# Patient Record
Sex: FEMALE | Race: OTHER | HISPANIC OR LATINO | Employment: FULL TIME | URBAN - METROPOLITAN AREA
[De-identification: names, ages, dates, MRNs, and addresses within clinical notes are randomized per-mention and may not be internally consistent; named-entity substitution may affect disease eponyms.]

---

## 2020-09-25 ENCOUNTER — OFFICE VISIT (OUTPATIENT)
Dept: FAMILY MEDICINE CLINIC | Facility: CLINIC | Age: 20
End: 2020-09-25
Payer: COMMERCIAL

## 2020-09-25 VITALS
SYSTOLIC BLOOD PRESSURE: 120 MMHG | HEIGHT: 63 IN | OXYGEN SATURATION: 98 % | TEMPERATURE: 99.1 F | HEART RATE: 80 BPM | WEIGHT: 121 LBS | BODY MASS INDEX: 21.44 KG/M2 | DIASTOLIC BLOOD PRESSURE: 70 MMHG

## 2020-09-25 DIAGNOSIS — R45.89 SYMPTOMS OF DEPRESSION: ICD-10-CM

## 2020-09-25 DIAGNOSIS — Z23 IMMUNIZATION DUE: Primary | ICD-10-CM

## 2020-09-25 DIAGNOSIS — Z00.00 ANNUAL PHYSICAL EXAM: ICD-10-CM

## 2020-09-25 PROCEDURE — 3725F SCREEN DEPRESSION PERFORMED: CPT

## 2020-09-25 PROCEDURE — 90471 IMMUNIZATION ADMIN: CPT

## 2020-09-25 PROCEDURE — 90472 IMMUNIZATION ADMIN EACH ADD: CPT

## 2020-09-25 PROCEDURE — 90686 IIV4 VACC NO PRSV 0.5 ML IM: CPT

## 2020-09-25 PROCEDURE — 99395 PREV VISIT EST AGE 18-39: CPT

## 2020-09-25 PROCEDURE — 90715 TDAP VACCINE 7 YRS/> IM: CPT

## 2020-09-25 NOTE — PROGRESS NOTES
Assessment/Plan:         Problem List Items Addressed This Visit        Other    Annual physical exam     Pt wants labs         Relevant Orders    CBC and differential    Comprehensive metabolic panel    Lipid panel    Symptoms of depression     Pt feels she gets this on off most of her life has never pursued this since always seems to get through it  Recommend pt see therapist she will consider not suicidal           Other Visit Diagnoses     Immunization due    -  Primary    Relevant Orders    TDAP VACCINE GREATER THAN OR EQUAL TO 8YO IM    influenza vaccine, quadrivalent, 0 5 mL, preservative-free, for adult and pediatric patients 6 mos+ (AFLURIA, FLUARIX, FLULAVAL, FLUZONE)            Subjective:  Pt here for  physicalDepression Screening Follow-up Plan: Patient's depression screening was positive with a PHQ-2 score of 2  Their PHQ-9 score was   Patient assessed for underlying major depression  They have no active suicidal ideations  Brief counseling provided and recommend additional follow-up/re-evaluation next office visit  Patient ID: Koki Hopkins is a 23 y o  female  HPI    The following portions of the patient's history were reviewed and updated as appropriate:   She has no past medical history on file  ,  does not have any pertinent problems on file  ,   has a past surgical history that includes Breast lumpectomy (Right, 2015); Cystectomy (2000); and Hernia repair (2002)  ,  family history includes Aneurysm in her maternal grandmother; Diabetes in her paternal grandfather; Hypertension in her maternal grandmother  ,   reports that she has been smoking cigarettes  She has never used smokeless tobacco  She reports current alcohol use  She reports that she does not use drugs  ,  has No Known Allergies     Current Outpatient Medications   Medication Sig Dispense Refill    norethindrone-ethinyl estradiol-iron (MICROGESTIN FE1 5/30) 1 5-30 MG-MCG tablet Take 1 tablet by mouth daily       No current facility-administered medications for this visit  Review of Systems   Constitutional: Negative for appetite change, chills, fatigue and fever  Respiratory: Negative for cough, chest tightness and shortness of breath  Cardiovascular: Negative for chest pain, palpitations and leg swelling  Gastrointestinal: Negative for abdominal pain, constipation, diarrhea, nausea and vomiting  Genitourinary: Negative for difficulty urinating and frequency  Musculoskeletal: Negative for arthralgias, back pain and neck pain  Skin: Negative for rash  Neurological: Negative for dizziness, weakness, light-headedness, numbness and headaches  Hematological: Does not bruise/bleed easily  Psychiatric/Behavioral: Negative for dysphoric mood and sleep disturbance  The patient is not nervous/anxious  Objective:  Vitals:    09/25/20 1501   BP: 120/70   Pulse: 80   Temp: 99 1 °F (37 3 °C)   SpO2: 98%   Weight: 54 9 kg (121 lb)   Height: 5' 3" (1 6 m)     Body mass index is 21 43 kg/m²  Physical Exam  Vitals signs reviewed  Constitutional:       General: She is not in acute distress  Appearance: Normal appearance  She is well-developed  HENT:      Head: Normocephalic  Right Ear: Tympanic membrane, ear canal and external ear normal       Left Ear: Tympanic membrane, ear canal and external ear normal       Nose: Nose normal       Mouth/Throat:      Pharynx: No oropharyngeal exudate  Eyes:      General: Lids are normal       Extraocular Movements: Extraocular movements intact  Conjunctiva/sclera: Conjunctivae normal       Pupils: Pupils are equal, round, and reactive to light  Neck:      Musculoskeletal: Normal range of motion and neck supple  Thyroid: No thyromegaly  Vascular: No carotid bruit  Cardiovascular:      Rate and Rhythm: Normal rate and regular rhythm  Pulses: Normal pulses  Heart sounds: Normal heart sounds  No murmur  No friction rub     Pulmonary: Effort: Pulmonary effort is normal  No respiratory distress  Breath sounds: Normal breath sounds  No stridor  No wheezing, rhonchi or rales  Abdominal:      General: Bowel sounds are normal  There is no distension  Palpations: Abdomen is soft  There is no mass  Tenderness: There is no abdominal tenderness  There is no guarding  Hernia: No hernia is present  Musculoskeletal: Normal range of motion  Lymphadenopathy:      Cervical: No cervical adenopathy  Skin:     General: Skin is warm and dry  Comments: No abnormal appearing moles tattoo left arm   Neurological:      General: No focal deficit present  Mental Status: She is alert and oriented to person, place, and time  Mental status is at baseline  Cranial Nerves: No cranial nerve deficit  Sensory: No sensory deficit  Motor: No tremor or abnormal muscle tone  Coordination: Coordination normal       Gait: Gait normal       Deep Tendon Reflexes: Reflexes normal    Psychiatric:         Mood and Affect: Mood normal          Speech: Speech normal          Behavior: Behavior normal          Thought Content:  Thought content normal          Judgment: Judgment normal

## 2020-09-25 NOTE — ASSESSMENT & PLAN NOTE
Pt feels she gets this on off most of her life has never pursued this since always seems to get through it  Recommend pt see therapist she will consider not suicidal

## 2020-10-01 DIAGNOSIS — Z30.41 ORAL CONTRACEPTIVE PILL SURVEILLANCE: Primary | ICD-10-CM

## 2020-10-01 RX ORDER — NORETHINDRONE ACETATE AND ETHINYL ESTRADIOL AND FERROUS FUMARATE 1MG-20(24)
1 KIT ORAL DAILY
COMMUNITY
End: 2020-10-01

## 2020-10-01 RX ORDER — NORETHINDRONE ACETATE AND ETHINYL ESTRADIOL 1.5-30(21)
1 KIT ORAL DAILY
Qty: 84 TABLET | Refills: 0 | Status: SHIPPED | OUTPATIENT
Start: 2020-10-01 | End: 2020-11-23

## 2020-11-22 DIAGNOSIS — Z30.41 ORAL CONTRACEPTIVE PILL SURVEILLANCE: ICD-10-CM

## 2020-11-23 DIAGNOSIS — Z30.41 ORAL CONTRACEPTIVE PILL SURVEILLANCE: ICD-10-CM

## 2020-11-23 RX ORDER — NORETHINDRONE ACETATE AND ETHINYL ESTRADIOL AND FERROUS FUMARATE 1.5-30(21)
KIT ORAL
Qty: 28 TABLET | Refills: 1 | Status: SHIPPED | OUTPATIENT
Start: 2020-11-23 | End: 2021-03-08

## 2020-11-23 RX ORDER — NORETHINDRONE ACETATE AND ETHINYL ESTRADIOL AND FERROUS FUMARATE 1.5-30(21)
KIT ORAL
Qty: 28 TABLET | Refills: 2 | Status: SHIPPED | OUTPATIENT
Start: 2020-11-23 | End: 2020-12-07 | Stop reason: SDUPTHER

## 2020-12-07 DIAGNOSIS — Z30.41 ORAL CONTRACEPTIVE PILL SURVEILLANCE: ICD-10-CM

## 2020-12-07 RX ORDER — NORETHINDRONE ACETATE AND ETHINYL ESTRADIOL 1.5-30(21)
1 KIT ORAL DAILY
Qty: 28 TABLET | Refills: 0 | Status: SHIPPED | OUTPATIENT
Start: 2020-12-07 | End: 2021-11-16

## 2021-03-05 DIAGNOSIS — Z30.41 ORAL CONTRACEPTIVE PILL SURVEILLANCE: ICD-10-CM

## 2021-03-08 RX ORDER — NORETHINDRONE ACETATE AND ETHINYL ESTRADIOL AND FERROUS FUMARATE 1.5-30(21)
KIT ORAL
Qty: 28 TABLET | Refills: 1 | Status: SHIPPED | OUTPATIENT
Start: 2021-03-08 | End: 2021-04-14

## 2021-03-08 NOTE — TELEPHONE ENCOUNTER
Pt called and states she does need refills and she is scheduled for annual 4/14/2021  Called in a 3 month supply to [facility] pharmacy

## 2021-04-14 ENCOUNTER — ANNUAL EXAM (OUTPATIENT)
Dept: OBGYN CLINIC | Facility: CLINIC | Age: 21
End: 2021-04-14
Payer: COMMERCIAL

## 2021-04-14 VITALS
HEIGHT: 63 IN | SYSTOLIC BLOOD PRESSURE: 118 MMHG | BODY MASS INDEX: 19.28 KG/M2 | WEIGHT: 108.8 LBS | DIASTOLIC BLOOD PRESSURE: 78 MMHG

## 2021-04-14 DIAGNOSIS — Z30.9 ENCOUNTER FOR CONTRACEPTIVE MANAGEMENT, UNSPECIFIED TYPE: ICD-10-CM

## 2021-04-14 DIAGNOSIS — Z11.3 ENCOUNTER FOR SPECIAL SCREENING EXAMINATION FOR INFECTION WITH PREDOMINANTLY SEXUAL MODE OF TRANSMISSION: ICD-10-CM

## 2021-04-14 DIAGNOSIS — R11.2 NAUSEA AND VOMITING, INTRACTABILITY OF VOMITING NOT SPECIFIED, UNSPECIFIED VOMITING TYPE: ICD-10-CM

## 2021-04-14 DIAGNOSIS — Z72.51 HIGH RISK HETEROSEXUAL BEHAVIOR: ICD-10-CM

## 2021-04-14 DIAGNOSIS — Z01.419 ROUTINE GYNECOLOGICAL EXAMINATION: Primary | ICD-10-CM

## 2021-04-14 PROCEDURE — G0145 SCR C/V CYTO,THINLAYER,RESCR: HCPCS | Performed by: OBSTETRICS & GYNECOLOGY

## 2021-04-14 PROCEDURE — 3008F BODY MASS INDEX DOCD: CPT | Performed by: OBSTETRICS & GYNECOLOGY

## 2021-04-14 PROCEDURE — 87491 CHLMYD TRACH DNA AMP PROBE: CPT | Performed by: OBSTETRICS & GYNECOLOGY

## 2021-04-14 PROCEDURE — 87591 N.GONORRHOEAE DNA AMP PROB: CPT | Performed by: OBSTETRICS & GYNECOLOGY

## 2021-04-14 PROCEDURE — 99385 PREV VISIT NEW AGE 18-39: CPT | Performed by: OBSTETRICS & GYNECOLOGY

## 2021-04-14 PROCEDURE — 1036F TOBACCO NON-USER: CPT | Performed by: OBSTETRICS & GYNECOLOGY

## 2021-04-14 RX ORDER — ONDANSETRON 4 MG/1
4 TABLET, ORALLY DISINTEGRATING ORAL EVERY 8 HOURS SCHEDULED
Qty: 60 TABLET | Refills: 3 | Status: SHIPPED | OUTPATIENT
Start: 2021-04-14 | End: 2021-05-14

## 2021-04-14 RX ORDER — NORETHINDRONE ACETATE AND ETHINYL ESTRADIOL 1MG-20(21)
1 KIT ORAL DAILY
Qty: 28 TABLET | Refills: 12 | Status: SHIPPED | OUTPATIENT
Start: 2021-04-14 | End: 2022-05-16 | Stop reason: SDUPTHER

## 2021-04-14 NOTE — PROGRESS NOTES
Subjective      Winston Wilkinson is a 23 y o  female who presents for annual well woman exam  Periods are regular every 28-30 days, lasting 5 days  No intermenstrual bleeding, spotting, or discharge  Current contraception: OCP (estrogen/progesterone)  Patient has positive COVID in November and since then she lost her taste and ability to smell still not able to eat lost 10 lb consider Zofran for nausea and help patient with her symptom patient is taking birth control pills for contraception desire to continue risk benefit side effect explained and discussed with patient in details all questions answered and patient was satisfied      Menstrual History:  OB History        0    Para   0    Term   0       0    AB   0    Living   0       SAB   0    TAB   0    Ectopic   0    Multiple   0    Live Births   0                  Patient's last menstrual period was 2021 (lmp unknown)  Period Pattern: Regular  Menstrual Flow: Moderate    The following portions of the patient's history were reviewed and updated as appropriate: allergies, current medications, past family history, past medical history, past social history, past surgical history and problem list     Review of Systems  Review of Systems   Constitutional: Negative for activity change, appetite change, chills, fatigue and fever  Respiratory: Negative for cough and shortness of breath  Cardiovascular: Negative for chest pain, palpitations and leg swelling  Gastrointestinal: Negative for abdominal pain, constipation, diarrhea, nausea and vomiting  Genitourinary: Negative for difficulty urinating, dysuria, flank pain, frequency, hematuria, urgency and vaginal discharge  Neurological: Negative for dizziness and headaches  Psychiatric/Behavioral: Negative for confusion            Objective      /78 (BP Location: Left arm, Patient Position: Sitting, Cuff Size: Standard)   Ht 5' 3" (1 6 m)   Wt 49 4 kg (108 lb 12 8 oz)   LMP 03/18/2021 (LMP Unknown)   BMI 19 27 kg/m²     Physical Exam  OBGyn Exam     General:   alert and oriented, in no acute distress, alert, appears stated age and cooperative   Heart: regular rate and rhythm, S1, S2 normal, no murmur, click, rub or gallop   Lungs: clear to auscultation bilaterally   Abdomen: soft, non-tender, without masses or organomegaly   Vulva: normal   Vagina: normal mucosa, normal discharge   Cervix: no cervical motion tenderness and no lesions   Uterus: normal size   Adnexa:  Breast Exam:  normal adnexa  breasts appear normal, no suspicious masses, no skin or nipple changes or axillary nodes  Assessment      @well woman@   56-year-old female  Annual exam   OCP contraception   Recent positive COVID complicated by lost of taste and smell  Plan   Pap/reflux   GC/CT   Diet/exercise  Calcium/vitamin-D  Zofran for nausea and vomiting   Continue OCP   Condom with sexual activity   Return to office for annual exam   All questions answered  There are no Patient Instructions on file for this visit

## 2021-04-15 LAB
C TRACH DNA SPEC QL NAA+PROBE: NEGATIVE
N GONORRHOEA DNA SPEC QL NAA+PROBE: NEGATIVE

## 2021-04-21 LAB
LAB AP GYN PRIMARY INTERPRETATION: NORMAL
Lab: NORMAL

## 2021-05-04 ENCOUNTER — HOSPITAL ENCOUNTER (EMERGENCY)
Facility: HOSPITAL | Age: 21
Discharge: HOME | End: 2021-05-04
Attending: EMERGENCY MEDICINE
Payer: COMMERCIAL

## 2021-05-04 ENCOUNTER — APPOINTMENT (EMERGENCY)
Dept: RADIOLOGY | Facility: HOSPITAL | Age: 21
End: 2021-05-04
Payer: COMMERCIAL

## 2021-05-04 VITALS
RESPIRATION RATE: 18 BRPM | SYSTOLIC BLOOD PRESSURE: 154 MMHG | TEMPERATURE: 99.6 F | HEART RATE: 90 BPM | DIASTOLIC BLOOD PRESSURE: 67 MMHG | OXYGEN SATURATION: 99 %

## 2021-05-04 DIAGNOSIS — R05.9 COUGH: Primary | ICD-10-CM

## 2021-05-04 LAB
BILIRUB UR QL STRIP: NEGATIVE
CLARITY UR: ABNORMAL
COLOR UR: ABNORMAL
EXT PREG TEST URINE: NEGATIVE
EXT. CONTROL ED NAV: NORMAL
GLUCOSE UR STRIP-MCNC: NEGATIVE MG/DL
HGB UR QL STRIP.AUTO: NEGATIVE
KETONES UR STRIP-MCNC: ABNORMAL MG/DL
LEUKOCYTE ESTERASE UR QL STRIP: NEGATIVE
NITRITE UR QL STRIP: NEGATIVE
PH UR STRIP.AUTO: 6 [PH]
PROT UR STRIP-MCNC: NEGATIVE MG/DL
SARS-COV-2 RNA RESP QL NAA+PROBE: NEGATIVE
SP GR UR STRIP.AUTO: 1.02 (ref 1–1.03)
UROBILINOGEN UR QL STRIP.AUTO: 0.2 E.U./DL

## 2021-05-04 PROCEDURE — 81003 URINALYSIS AUTO W/O SCOPE: CPT | Performed by: EMERGENCY MEDICINE

## 2021-05-04 PROCEDURE — U0005 INFEC AGEN DETEC AMPLI PROBE: HCPCS | Performed by: EMERGENCY MEDICINE

## 2021-05-04 PROCEDURE — U0003 INFECTIOUS AGENT DETECTION BY NUCLEIC ACID (DNA OR RNA); SEVERE ACUTE RESPIRATORY SYNDROME CORONAVIRUS 2 (SARS-COV-2) (CORONAVIRUS DISEASE [COVID-19]), AMPLIFIED PROBE TECHNIQUE, MAKING USE OF HIGH THROUGHPUT TECHNOLOGIES AS DESCRIBED BY CMS-2020-01-R: HCPCS | Performed by: EMERGENCY MEDICINE

## 2021-05-04 PROCEDURE — 81025 URINE PREGNANCY TEST: CPT | Performed by: EMERGENCY MEDICINE

## 2021-05-04 PROCEDURE — 99283 EMERGENCY DEPT VISIT LOW MDM: CPT

## 2021-05-04 PROCEDURE — 99285 EMERGENCY DEPT VISIT HI MDM: CPT | Performed by: EMERGENCY MEDICINE

## 2021-05-04 PROCEDURE — 71045 X-RAY EXAM CHEST 1 VIEW: CPT

## 2021-05-04 RX ORDER — IBUPROFEN 400 MG/1
400 TABLET ORAL ONCE
Status: COMPLETED | OUTPATIENT
Start: 2021-05-04 | End: 2021-05-04

## 2021-05-04 RX ADMIN — IBUPROFEN 400 MG: 400 TABLET ORAL at 20:32

## 2021-05-05 NOTE — ED PROVIDER NOTES
History  Chief Complaint   Patient presents with    Cough     cough and body aches/chills     HPI     25-year-old female with no significant past medical history presents for evaluation of a cough  Patient states she started to have cough 4 days ago  Has been productive clear mucus  Patient endorses chest pain and shortness of breath only after coughing repeatedly  No chest pain at rest   Patient states she has had fevers up to 101 at home  She has been taking Motrin and Tylenol  Patient also endorses generalized bodyaches myalgias  Patient denies sore throat or nasal congestion  Denies nausea, vomiting, diarrhea, or dysuria  She does endorse slight pressure in her lower abdomen when urinating since yesterday  Patient denies any recent sick contacts  Patient has not had a COVID vaccine  Patient thinks she had COVID back in November but did not have a test to confirm it  Patient states she had a COVID test performed yesterday at [facility] which was negative but was told it could be a false negative  Patient denies any tobacco or alcohol use  Patient endorses occasional marijuana use  Prior to Admission Medications   Prescriptions Last Dose Informant Patient Reported? Taking?   norethindrone-ethinyl estradiol (JUNEL FE 1/20) 1-20 MG-MCG per tablet   No No   Sig: Take 1 tablet by mouth daily   norethindrone-ethinyl estradiol-iron (Junel FE 1 5/30) 1 5-30 MG-MCG tablet   No No   Sig: Take 1 tablet by mouth daily for 28 days   ondansetron (ZOFRAN-ODT) 4 mg disintegrating tablet   No No   Sig: Take 1 tablet (4 mg total) by mouth every 8 (eight) hours      Facility-Administered Medications: None       History reviewed  No pertinent past medical history      Past Surgical History:   Procedure Laterality Date    BREAST LUMPECTOMY Right 2015    benign    CYSTECTOMY  2000    on neck    HERNIA REPAIR  2002    inner thigh       Family History   Problem Relation Age of Onset    Hypertension Maternal Grandmother  Aneurysm Maternal Grandmother     Diabetes Paternal Grandfather      I have reviewed and agree with the history as documented  E-Cigarette/Vaping    E-Cigarette Use Never User      E-Cigarette/Vaping Substances    Nicotine No     Flavoring No      Social History     Tobacco Use    Smoking status: Former Smoker     Types: Cigarettes    Smokeless tobacco: Never Used    Tobacco comment: socially per [employer]   Substance Use Topics    Alcohol use: Yes     Frequency: 2-4 times a month     Drinks per session: 1 or 2    Drug use: Never        Review of Systems   Constitutional: Positive for chills and fever  Negative for appetite change, fatigue and unexpected weight change  HENT: Negative for congestion, rhinorrhea and sore throat  Respiratory: Positive for cough and shortness of breath  Negative for chest tightness and wheezing  Cardiovascular: Positive for chest pain  Negative for palpitations and leg swelling  Gastrointestinal: Negative for abdominal pain, constipation, diarrhea, nausea and vomiting  Genitourinary: Negative for dysuria, frequency, hematuria and urgency  Musculoskeletal: Negative for arthralgias and myalgias  Skin: Negative for color change, pallor and rash  Neurological: Negative for dizziness, weakness, light-headedness, numbness and headaches  All other systems reviewed and are negative  Physical Exam  ED Triage Vitals [05/04/21 2008]   Temperature Pulse Respirations Blood Pressure SpO2   100 1 °F (37 8 °C) 90 18 154/67 99 %      Temp Source Heart Rate Source Patient Position - Orthostatic VS BP Location FiO2 (%)   Tympanic Monitor Sitting Right arm --      Pain Score       --             Orthostatic Vital Signs  Vitals:    05/04/21 2008   BP: 154/67   Pulse: 90   Patient Position - Orthostatic VS: Sitting       Physical Exam  Vitals signs and nursing note reviewed  Constitutional:       General: She is not in acute distress  Appearance: Normal appearance  She is well-developed and normal weight  She is not ill-appearing, toxic-appearing or diaphoretic  HENT:      Head: Normocephalic and atraumatic  Nose: Nose normal       Mouth/Throat:      Mouth: Mucous membranes are moist       Pharynx: Oropharynx is clear  Eyes:      Extraocular Movements: Extraocular movements intact  Conjunctiva/sclera: Conjunctivae normal    Neck:      Musculoskeletal: Neck supple  Cardiovascular:      Rate and Rhythm: Normal rate and regular rhythm  Pulses: Normal pulses  Heart sounds: Normal heart sounds  No murmur  No friction rub  No gallop  Pulmonary:      Effort: Pulmonary effort is normal  No respiratory distress  Breath sounds: Normal breath sounds  No wheezing or rales  Abdominal:      General: There is no distension  Palpations: Abdomen is soft  Tenderness: There is no abdominal tenderness  There is no guarding or rebound  Musculoskeletal:         General: No tenderness  Right lower leg: No edema  Left lower leg: No edema  Skin:     General: Skin is warm and dry  Coloration: Skin is not pale  Findings: No erythema or rash  Neurological:      General: No focal deficit present  Mental Status: She is alert and oriented to person, place, and time  Cranial Nerves: No cranial nerve deficit  Sensory: No sensory deficit  Motor: No weakness     Psychiatric:         Mood and Affect: Mood normal          Behavior: Behavior normal          ED Medications  Medications   ibuprofen (MOTRIN) tablet 400 mg (400 mg Oral Given 5/4/21 2032)       Diagnostic Studies  Results Reviewed     Procedure Component Value Units Date/Time    Novel Coronavirus (Covid-19),PCR [facility] - 2 Hour Stat [[id number]]  (Normal) Collected: 05/04/21 2032    Lab Status: Final result Specimen: Nares from Nose Updated: 05/04/21 2141     SARS-CoV-2 Negative    Narrative:           UA w Reflex to Microscopic w Reflex to Culture [[id number]] (Abnormal) Collected: 05/04/21 2106    Lab Status: Final result Specimen: Urine, Clean Catch Updated: 05/04/21 2118     Color, UA Dk Yellow     Clarity, UA Cloudy     Specific [place], UA 1 020     pH, UA 6 0     Leukocytes, UA Negative     Nitrite, UA Negative     Protein, UA Negative mg/dl      Glucose, UA Negative mg/dl      Ketones, UA Trace mg/dl      Urobilinogen, UA 0 2 E U /dl      Bilirubin, UA Negative     Blood, UA Negative    POCT pregnancy, urine [[id number]]  (Normal) Resulted: 05/04/21 2109    Lab Status: Final result Updated: 05/04/21 2109     EXT PREG TEST UR (Ref: Negative) negative     Control valid                 XR chest portable   ED Interpretation by Tripp Lehman MD (05/04 2135)   No acute cardiopulmonary disease  Final Result by Danae Houston MD (05/04 2222)      No evidence of acute pulmonary disease  Workstation performed: [id number]               Procedures  Procedures      ED Course                                       MDM     43-year-old female with no significant past medical history presents for evaluation of cough, fevers, myalgias the past 4 days  Differential diagnosis includes:  COVID, viral syndrome, pneumonia, UTI  Will obtain chest x-ray, COVID swab, UA and urine pregnancy  Will give motrin for symptoms  CXR negative for acute cardiopulmonary disease  Urine negative for UTI  COVID negative  Discussed results with patient, likely viral upper respiratory infection  Discussed symptomatic treatment with Motrin and Tylenol  Instructed patient to follow-up with her primary care doctor  Discussed strict return precautions  Patient expressed understanding was agreeable for discharge      Disposition  Final diagnoses:   Cough     Time reflects when diagnosis was documented in both MDM as applicable and the Disposition within this note     Time User Action Codes Description Comment    5/4/2021  9:33 PM Eder Ear Add [R05] Cough       ED Disposition     ED Disposition Condition Date/Time Comment    Discharge Stable Tue May 4, 2021  9:42 PM [facility] discharge to home/self care  Follow-up Information     Follow up With Specialties Details Why Contact Info    Augusta Gates MD Family Medicine Schedule an appointment as soon as possible for a visit in 2 days  [address]  [phone] [address]  [phone]            Discharge Medication List as of 5/4/2021  9:42 PM      CONTINUE these medications which have NOT CHANGED    Details   norethindrone-ethinyl estradiol (JUNEL FE 1/20) 1-20 MG-MCG per tablet Take 1 tablet by mouth daily, Starting Wed 4/14/2021, Normal      norethindrone-ethinyl estradiol-iron (Junel FE 1 5/30) 1 5-30 MG-MCG tablet Take 1 tablet by mouth daily for 28 days, Starting Mon 12/7/2020, Until Mon 1/4/2021, Normal      ondansetron (ZOFRAN-ODT) 4 mg disintegrating tablet Take 1 tablet (4 mg total) by mouth every 8 (eight) hours, Starting Wed 4/14/2021, Until Fri 5/14/2021, Normal           No discharge procedures on file  PDMP Review     None           ED Provider  Attending physically available and evaluated [facility]  I managed the patient along with the ED Attending      Electronically Signed by         Re Zambrano MD  05/05/21 9115

## 2021-05-05 NOTE — ED ATTENDING ATTESTATION
5/4/2021  IKayode MD, saw and evaluated the patient  I have discussed the patient with the resident/non-physician practitioner and agree with the resident's/non-physician practitioner's findings, Plan of Care, and MDM as documented in the resident's/non-physician practitioner's note, except where noted  All available labs and Radiology studies were reviewed  I was present for key portions of any procedure(s) performed by the resident/non-physician practitioner and I was immediately available to provide assistance  At this point I agree with the current assessment done in the Emergency Department  I have conducted an independent evaluation of this patient a history and physical is as follows:    OA: 18 y/o f c/o cough and congestion x 4 days  Cough is occasionally productive of clear sputum  Denies CP but does note that after repeat episodes of coughing she does have a feeling of chest soreness and needs to catch her breath  Otherwise denies cp/pressure/sob/SHOEMAKER  No sore throat, no dysphagia, no neck pain  No headache  No lightheadedness/dizziness  Notes that she thinks she had covid in November but was not tested  Since that time, food has tasted bad and it makes her not want to eat but this is unchanged  Tolerating fluids without difficulty  + fevers at home to 101  Last took motrin/tylenol at 11 am this morning  Denies headaches, rash, abd or back pain  Tolerating PO  Unknown sick contacts but works at [employer]  and her mother (with whom she does not live) recently had covid  PE, NAD, VSS, NC/AT, MMM, clear sclera/conjunctiva, posterior oropharynx WNL, neck supple/fROM, - lymphadenopathy/no meningismus, RR/-murmurs, lungs CTAB, -w/r, - accessory muscle use, speaks in complete sentences, abd soft, NT/ND, +BS, -r/g, - edema, no calf ttp, intact distal pulses and capillary refill < 2 sec, scattered areas of dry skin on hands/eczema, AAO  A/p cough and congestion  Concern for viral illness   Eval with CXR, covid swab, u/a with urine pregnancy  ED Course   Informed pt wishes to leave at this time as her mother needs to go home  Tolerating PO in the ED without difficulty  Ambulatory pulse ox 98%  Will advise covid precautions and vitamin use  Strict return and f/u precautions         Critical Care Time  Procedures

## 2021-05-05 NOTE — DISCHARGE INSTRUCTIONS
Please follow up with your primary care doctor in the next 2-3 days  Return to the ER if you have severe or worsening shortness of breath or lightheadedness and feel like you are going to pass out  You may take motrin or tylenol as needed for discomfort and fever

## 2021-05-17 ENCOUNTER — IMMUNIZATIONS (OUTPATIENT)
Dept: FAMILY MEDICINE CLINIC | Facility: HOSPITAL | Age: 21
End: 2021-05-17

## 2021-05-17 DIAGNOSIS — Z23 ENCOUNTER FOR IMMUNIZATION: Primary | ICD-10-CM

## 2021-05-17 PROCEDURE — 0001A SARS-COV-2 / COVID-19 MRNA VACCINE (PFIZER-BIONTECH) 30 MCG: CPT

## 2021-05-17 PROCEDURE — 91300 SARS-COV-2 / COVID-19 MRNA VACCINE (PFIZER-BIONTECH) 30 MCG: CPT

## 2021-06-15 ENCOUNTER — IMMUNIZATIONS (OUTPATIENT)
Dept: FAMILY MEDICINE CLINIC | Facility: HOSPITAL | Age: 21
End: 2021-06-15

## 2021-06-15 DIAGNOSIS — Z23 ENCOUNTER FOR IMMUNIZATION: Primary | ICD-10-CM

## 2021-06-15 PROCEDURE — 0002A SARS-COV-2 / COVID-19 MRNA VACCINE (PFIZER-BIONTECH) 30 MCG: CPT

## 2021-06-15 PROCEDURE — 91300 SARS-COV-2 / COVID-19 MRNA VACCINE (PFIZER-BIONTECH) 30 MCG: CPT

## 2021-11-16 DIAGNOSIS — Z30.41 ORAL CONTRACEPTIVE PILL SURVEILLANCE: ICD-10-CM

## 2021-11-16 RX ORDER — NORETHINDRONE ACETATE AND ETHINYL ESTRADIOL AND FERROUS FUMARATE 1.5-30(21)
KIT ORAL
Qty: 28 TABLET | Refills: 2 | Status: SHIPPED | OUTPATIENT
Start: 2021-11-16 | End: 2022-02-20

## 2022-02-20 DIAGNOSIS — Z30.41 ORAL CONTRACEPTIVE PILL SURVEILLANCE: ICD-10-CM

## 2022-02-20 RX ORDER — NORETHINDRONE ACETATE AND ETHINYL ESTRADIOL AND FERROUS FUMARATE 1.5-30(21)
KIT ORAL
Qty: 84 TABLET | Refills: 0 | Status: SHIPPED | OUTPATIENT
Start: 2022-02-20 | End: 2022-05-16

## 2022-05-16 ENCOUNTER — ANNUAL EXAM (OUTPATIENT)
Dept: OBGYN CLINIC | Facility: CLINIC | Age: 22
End: 2022-05-16
Payer: COMMERCIAL

## 2022-05-16 VITALS
HEIGHT: 62 IN | WEIGHT: 102.6 LBS | BODY MASS INDEX: 18.88 KG/M2 | SYSTOLIC BLOOD PRESSURE: 124 MMHG | DIASTOLIC BLOOD PRESSURE: 78 MMHG

## 2022-05-16 DIAGNOSIS — Z30.9 ENCOUNTER FOR CONTRACEPTIVE MANAGEMENT, UNSPECIFIED TYPE: ICD-10-CM

## 2022-05-16 DIAGNOSIS — Z01.419 ENCOUNTER FOR GYNECOLOGICAL EXAMINATION WITHOUT ABNORMAL FINDING: Primary | ICD-10-CM

## 2022-05-16 DIAGNOSIS — Z11.3 SCREENING EXAMINATION FOR STD (SEXUALLY TRANSMITTED DISEASE): ICD-10-CM

## 2022-05-16 PROBLEM — Z00.00 ANNUAL PHYSICAL EXAM: Status: RESOLVED | Noted: 2020-09-25 | Resolved: 2022-05-16

## 2022-05-16 PROCEDURE — 3008F BODY MASS INDEX DOCD: CPT | Performed by: PHYSICIAN ASSISTANT

## 2022-05-16 PROCEDURE — G0145 SCR C/V CYTO,THINLAYER,RESCR: HCPCS | Performed by: PHYSICIAN ASSISTANT

## 2022-05-16 PROCEDURE — 99395 PREV VISIT EST AGE 18-39: CPT | Performed by: PHYSICIAN ASSISTANT

## 2022-05-16 PROCEDURE — 87491 CHLMYD TRACH DNA AMP PROBE: CPT | Performed by: PHYSICIAN ASSISTANT

## 2022-05-16 PROCEDURE — 87591 N.GONORRHOEAE DNA AMP PROB: CPT | Performed by: PHYSICIAN ASSISTANT

## 2022-05-16 PROCEDURE — 1036F TOBACCO NON-USER: CPT | Performed by: PHYSICIAN ASSISTANT

## 2022-05-16 RX ORDER — NORETHINDRONE ACETATE AND ETHINYL ESTRADIOL 1MG-20(21)
1 KIT ORAL DAILY
Qty: 84 TABLET | Refills: 3 | Status: SHIPPED | OUTPATIENT
Start: 2022-05-16 | End: 2022-05-24 | Stop reason: SDUPTHER

## 2022-05-16 NOTE — PROGRESS NOTES
Assessment/Plan:    No problem-specific Assessment & Plan notes found for this encounter  Diagnoses and all orders for this visit:    Encounter for gynecological examination without abnormal finding  -     Liquid-based pap, screening    Screening examination for STD (sexually transmitted disease)  -     Chlamydia/GC amplified DNA by PCR    Encounter for contraceptive management, unspecified type  -     norethindrone-ethinyl estradiol (JUNEL FE 1/20) 1-20 MG-MCG per tablet; Take 1 tablet by mouth in the morning  Pap and GC/chlamydia screening done  We will call with STD testing results  Refill for 90 days x 3 of OCP sent to pharmacy  Patient to call insurance if they will not allow 3 mos supply; can write prescription as needed  If no problems, patient to return in 1 year for routine gyn care  Subjective:      Patient ID: Lilo Bocanegra is a 23 y o  female  Patient is here for yearly gyn exam   States she is doing well overall  Taking OCP continuously; does not get a period  Is having difficulty getting insurance to allow her refills on time  She is sexually active with a monogamous partner; requests STD screening  Denies bowel/bladder changes, pelvic pain, bloating, abdominal pain, n/v, change in appetite, and thyroid disease  Of note, 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  Patient is performing self-breast exam   Denies new masses, skin changes, nipple discharge, and pain/tenderness  The following portions of the patient's history were reviewed and updated as appropriate: allergies, current medications, past family history, past medical history, past social history, past surgical history and problem list     Review of Systems   Constitutional: Negative for appetite change and unexpected weight change  Cardiovascular:        No masses, skin changes, nipple discharge, and pain/tenderness     Gastrointestinal: Negative for abdominal distention, abdominal pain, constipation, diarrhea, nausea and vomiting  Genitourinary: Negative for difficulty urinating, dysuria, frequency, genital sores, hematuria, menstrual problem, pelvic pain, urgency, vaginal bleeding, vaginal discharge and vaginal pain  Objective:      /78 (BP Location: Left arm, Patient Position: Sitting, Cuff Size: Standard)   Ht 5' 2" (1 575 m)   Wt 46 5 kg (102 lb 9 6 oz)   LMP  (LMP Unknown)   BMI 18 77 kg/m²          Physical Exam  Vitals reviewed  Exam conducted with a chaperone present  Constitutional:       Appearance: Normal appearance  She is well-developed and normal weight  Neck:      Thyroid: No thyromegaly  Pulmonary:      Effort: Pulmonary effort is normal    Chest:   Breasts: Breasts are symmetrical       Right: Normal  No swelling, bleeding, inverted nipple, mass, nipple discharge, skin change, tenderness, axillary adenopathy or supraclavicular adenopathy  Left: Normal  No swelling, bleeding, inverted nipple, mass, nipple discharge, skin change, tenderness, axillary adenopathy or supraclavicular adenopathy  Abdominal:      General: Abdomen is flat  There is no distension  Palpations: Abdomen is soft  Tenderness: There is no abdominal tenderness  Genitourinary:     General: Normal vulva  Pubic Area: No rash  Labia:         Right: No rash, tenderness, lesion or injury  Left: No rash, tenderness, lesion or injury  Vagina: Normal  No vaginal discharge, erythema, tenderness or bleeding  Cervix: Friability present  Uterus: Normal        Adnexa: Right adnexa normal and left adnexa normal         Right: No mass, tenderness or fullness  Left: No mass, tenderness or fullness  Musculoskeletal:      Cervical back: Neck supple  Lymphadenopathy:      Cervical: No cervical adenopathy  Upper Body:      Right upper body: No supraclavicular or axillary adenopathy        Left upper body: No supraclavicular or axillary adenopathy  Lower Body: No right inguinal adenopathy  No left inguinal adenopathy  Skin:     General: Skin is warm and dry  Neurological:      Mental Status: She is alert and oriented to person, place, and time  Psychiatric:         Mood and Affect: Mood normal          Behavior: Behavior normal  Behavior is cooperative  Thought Content:  Thought content normal          Judgment: Judgment normal

## 2022-05-17 LAB
C TRACH DNA SPEC QL NAA+PROBE: NEGATIVE
N GONORRHOEA DNA SPEC QL NAA+PROBE: NEGATIVE

## 2022-05-23 LAB
LAB AP GYN PRIMARY INTERPRETATION: NORMAL
Lab: NORMAL

## 2022-05-24 DIAGNOSIS — Z30.9 ENCOUNTER FOR CONTRACEPTIVE MANAGEMENT, UNSPECIFIED TYPE: ICD-10-CM

## 2022-05-25 RX ORDER — NORETHINDRONE ACETATE AND ETHINYL ESTRADIOL 1MG-20(21)
1 KIT ORAL DAILY
Qty: 84 TABLET | Refills: 3 | Status: SHIPPED | OUTPATIENT
Start: 2022-05-25

## 2023-05-17 ENCOUNTER — ANNUAL EXAM (OUTPATIENT)
Dept: OBGYN CLINIC | Facility: CLINIC | Age: 23
End: 2023-05-17

## 2023-05-17 VITALS
BODY MASS INDEX: 18.81 KG/M2 | HEIGHT: 62 IN | SYSTOLIC BLOOD PRESSURE: 90 MMHG | DIASTOLIC BLOOD PRESSURE: 52 MMHG | WEIGHT: 102.2 LBS

## 2023-05-17 DIAGNOSIS — R11.2 NAUSEA AND VOMITING: ICD-10-CM

## 2023-05-17 DIAGNOSIS — Z11.3 ROUTINE SCREENING FOR STI (SEXUALLY TRANSMITTED INFECTION): ICD-10-CM

## 2023-05-17 DIAGNOSIS — Z01.419 ENCOUNTER FOR GYNECOLOGICAL EXAMINATION WITHOUT ABNORMAL FINDING: Primary | ICD-10-CM

## 2023-05-17 RX ORDER — ONDANSETRON 4 MG/1
4 TABLET, ORALLY DISINTEGRATING ORAL EVERY 8 HOURS PRN
Qty: 60 TABLET | Refills: 3 | Status: SHIPPED | OUTPATIENT
Start: 2023-05-17 | End: 2023-06-16

## 2023-05-17 RX ORDER — MULTIVITAMIN WITH IRON
8000 TABLET ORAL
COMMUNITY

## 2023-05-17 NOTE — PROGRESS NOTES
"Adela Doss is a 20 y o  female who presents for annual well woman exam  Periods are regular every 28-30 days, lasting 5 days  No intermenstrual bleeding, spotting, or discharge  Current contraception: none  History of abnormal Pap smear: no  Family history of uterine or ovarian cancer: no  Family history of breast cancer: no    Menstrual History:  OB History        0    Para   0    Term   0       0    AB   0    Living   0       SAB   0    IAB   0    Ectopic   0    Multiple   0    Live Births   0                  Patient's last menstrual period was 2023 (exact date)  Period Cycle (Days): 28  Period Duration (Days): 4  Period Pattern: Regular  Menstrual Flow: Moderate  Dysmenorrhea: (!) Moderate  Dysmenorrhea Symptoms: Cramping    The following portions of the patient's history were reviewed and updated as appropriate: allergies, current medications, past family history, past medical history, past social history, past surgical history and problem list     Review of Systems  Review of Systems   Constitutional: Negative for activity change, appetite change, chills, fatigue and fever  Respiratory: Negative for apnea, cough, chest tightness and shortness of breath  Cardiovascular: Negative for chest pain, palpitations and leg swelling  Gastrointestinal: Negative for abdominal pain, constipation, diarrhea, nausea and vomiting  Genitourinary: Negative for difficulty urinating, dysuria, flank pain, frequency, hematuria and urgency  Neurological: Negative for dizziness, seizures, syncope, light-headedness, numbness and headaches  Psychiatric/Behavioral: Negative for agitation and confusion            Objective      BP 90/52 (BP Location: Left arm, Patient Position: Sitting, Cuff Size: Adult)   Ht 5' 2\" (1 575 m)   Wt 46 4 kg (102 lb 3 2 oz)   LMP 2023 (Exact Date)   BMI 18 69 kg/m²     Physical Exam  OBGyn Exam     General:   alert and oriented, in no acute " distress, alert, appears stated age and cooperative   Heart: regular rate and rhythm, S1, S2 normal, no murmur, click, rub or gallop   Lungs: clear to auscultation bilaterally   Abdomen: soft, non-tender, without masses or organomegaly   Vulva: normal   Vagina: normal mucosa, normal discharge   Cervix: no cervical motion tenderness and no lesions   Uterus: normal size   Adnexa:  Breast Exam:  normal adnexa  breasts appear normal, no suspicious masses, no skin or nipple changes or axillary nodes  Assessment      @well woman@   75-year-old female  Annual exam   OCP contraception not on contraception now  Had  COVID complicated by lost of taste and smell  Plan   Pap/reflux  Neg 2022  GC/CT   Diet/exercise  Calcium/vitamin-D  Zofran for nausea and vomiting occasional   Condom with sexual activity   Return to office for annual exam       All questions answered  There are no Patient Instructions on file for this visit

## 2023-05-19 LAB
C TRACH DNA SPEC QL NAA+PROBE: NEGATIVE
M GENITALIUM DNA SPEC QL NAA+PROBE: NEGATIVE
N GONORRHOEA DNA SPEC QL NAA+PROBE: NEGATIVE
T VAGINALIS DNA SPEC QL NAA+PROBE: NEGATIVE